# Patient Record
Sex: FEMALE | Race: WHITE | NOT HISPANIC OR LATINO | URBAN - METROPOLITAN AREA
[De-identification: names, ages, dates, MRNs, and addresses within clinical notes are randomized per-mention and may not be internally consistent; named-entity substitution may affect disease eponyms.]

---

## 2019-11-14 NOTE — PROCEDURE NOTE: CLINICAL
PROCEDURE NOTE: Punctal Plugs, Extended #1 Bilateral Lower Lids. Diagnosis: Dysfunctional Tear Syndrome. Prior to treatment, the risks/benefits/alternatives were discussed. The patient wished to proceed with procedure. Size/location of plugs inserted: 0.4 mm DOUGLAS x 2 mm long . Patient tolerated procedure well. There were no complications. Post procedure instructions given. Hailey Childress

## 2020-01-29 NOTE — PATIENT DISCUSSION
ARTIFICIAL TEARS to affected eye(s) as needed. What Type Of Note Output Would You Prefer (Optional)?: Standard Output What Is The Reason For Today's Visit?: Full Body Skin Examination What Is The Reason For Today's Visit? (Being Monitored For X): the development of new lesions How Severe Are Your Spot(S)?: mild Additional History: 6 MONTH TBSE POST EXC W/MAINO , HIGH RISK PT\\nHX: SCC

## 2020-09-04 NOTE — PATIENT DISCUSSION
Advised regular use of Amsler grid. Called patient, confirmed date of surgery, and pre op instructions.  Patient verbalized understanding.  Instructions also mailed to patient.  Pre op appointment scheduled 5/15/18 with Magaly Mclean MD, Appointment request form faxed to [phone].

## 2021-09-30 NOTE — PATIENT DISCUSSION
Discussed AREDS supplements, BP Control, UV protection and dark leafy green vegetables. [Joint Pain] : joint pain [Negative] : Heme/Lymph

## 2021-09-30 NOTE — PROCEDURE NOTE: CLINICAL
PROCEDURE NOTE: Punctal Plugs, Micah Villanueva ([id number], [id number]) OU. Diagnosis: Keratoconjunctivitis Sicca, Not Specified As Sjögren's. Prior to treatment, the risks/benefits/alternatives were discussed. The patient wished to proceed with procedure. Temporary collagen plugs were inserted. Patient tolerated procedure well. There were no complications. Post procedure instructions given. David Smith

## 2022-03-03 ENCOUNTER — NEW PATIENT (OUTPATIENT)
Dept: URBAN - METROPOLITAN AREA CLINIC 14 | Facility: CLINIC | Age: 67
End: 2022-03-03

## 2022-03-03 DIAGNOSIS — H25.13: ICD-10-CM

## 2022-03-03 PROCEDURE — 92136 OPHTHALMIC BIOMETRY: CPT

## 2022-03-03 PROCEDURE — 99204 OFFICE O/P NEW MOD 45 MIN: CPT

## 2022-03-03 ASSESSMENT — VISUAL ACUITY
OD_BCVA: 20/30
OS_BCVA: 20/50
OS_CC: 20/200
OD_CC: 20/70

## 2022-03-03 ASSESSMENT — KERATOMETRY
OS_AXISANGLE2_DEGREES: 91
OS_K1POWER_DIOPTERS: 43.25
OS_K2POWER_DIOPTERS: 45.25
OD_AXISANGLE2_DEGREES: 92
OD_AXISANGLE_DEGREES: 2
OD_K2POWER_DIOPTERS: 47.50
OS_AXISANGLE_DEGREES: 1
OD_K1POWER_DIOPTERS: 41.50

## 2022-03-03 ASSESSMENT — TONOMETRY
OS_IOP_MMHG: 20
OD_IOP_MMHG: 17

## 2022-03-31 NOTE — PATIENT DISCUSSION
Consider puncta plugs BLL if symptoms worsening, pt feels dryness controlled well enough today so will not do plugs today.

## 2022-04-11 ENCOUNTER — ESTABLISHED PATIENT (OUTPATIENT)
Dept: URBAN - METROPOLITAN AREA CLINIC 14 | Facility: CLINIC | Age: 67
End: 2022-04-11

## 2022-04-11 DIAGNOSIS — H25.13: ICD-10-CM

## 2022-04-11 DIAGNOSIS — H43.812: ICD-10-CM

## 2022-04-11 PROCEDURE — 92014 COMPRE OPH EXAM EST PT 1/>: CPT

## 2022-04-11 PROCEDURE — 92134 CPTRZ OPH DX IMG PST SGM RTA: CPT

## 2022-04-11 ASSESSMENT — TONOMETRY
OD_IOP_MMHG: 16
OS_IOP_MMHG: 15

## 2022-04-11 ASSESSMENT — KERATOMETRY
OD_K2POWER_DIOPTERS: 47.50
OD_AXISANGLE_DEGREES: 2
OD_AXISANGLE2_DEGREES: 92
OS_K1POWER_DIOPTERS: 43.25
OS_K2POWER_DIOPTERS: 45.25
OS_AXISANGLE_DEGREES: 1
OS_AXISANGLE2_DEGREES: 91
OD_K1POWER_DIOPTERS: 41.50

## 2022-04-11 ASSESSMENT — VISUAL ACUITY
OD_SC: 20/60
OS_SC: 20/400

## 2022-04-14 ENCOUNTER — POST-OP (OUTPATIENT)
Dept: URBAN - METROPOLITAN AREA CLINIC 14 | Facility: CLINIC | Age: 67
End: 2022-04-14

## 2022-04-14 DIAGNOSIS — Z96.1: ICD-10-CM

## 2022-04-14 DIAGNOSIS — H25.13: ICD-10-CM

## 2022-04-14 PROCEDURE — 99024 POSTOP FOLLOW-UP VISIT: CPT

## 2022-04-14 PROCEDURE — 92136 OPHTHALMIC BIOMETRY: CPT

## 2022-04-14 ASSESSMENT — KERATOMETRY
OD_AXISANGLE_DEGREES: 2
OS_K1POWER_DIOPTERS: 43.25
OD_AXISANGLE2_DEGREES: 92
OS_K2POWER_DIOPTERS: 45.25
OD_K2POWER_DIOPTERS: 47.50
OS_AXISANGLE2_DEGREES: 91
OS_AXISANGLE_DEGREES: 1
OD_K1POWER_DIOPTERS: 41.50

## 2022-04-14 ASSESSMENT — TONOMETRY
OD_IOP_MMHG: 21
OS_IOP_MMHG: 26

## 2022-04-14 ASSESSMENT — VISUAL ACUITY
OS_SC: 20/20
OD_BCVA: 20/30

## 2022-04-21 ENCOUNTER — POST-OP (OUTPATIENT)
Dept: URBAN - METROPOLITAN AREA CLINIC 14 | Facility: CLINIC | Age: 67
End: 2022-04-21

## 2022-04-21 DIAGNOSIS — Z98.890: ICD-10-CM

## 2022-04-21 DIAGNOSIS — Z96.1: ICD-10-CM

## 2022-04-21 PROCEDURE — 99024 POSTOP FOLLOW-UP VISIT: CPT

## 2022-04-21 ASSESSMENT — VISUAL ACUITY
OD_PH: 20/50
OU_SC: 20/30
OD_SC: 20/100

## 2022-04-21 ASSESSMENT — TONOMETRY: OS_IOP_MMHG: 20
